# Patient Record
Sex: FEMALE | Race: WHITE | NOT HISPANIC OR LATINO | Employment: STUDENT | URBAN - METROPOLITAN AREA
[De-identification: names, ages, dates, MRNs, and addresses within clinical notes are randomized per-mention and may not be internally consistent; named-entity substitution may affect disease eponyms.]

---
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
## 2024-06-18 DIAGNOSIS — F41.9 ANXIETY: ICD-10-CM

## 2024-06-18 RX ORDER — BUSPIRONE HYDROCHLORIDE 7.5 MG/1
7.5 TABLET ORAL 3 TIMES DAILY
Qty: 270 TABLET | Refills: 0 | Status: SHIPPED | OUTPATIENT
Start: 2024-06-18

## 2024-07-05 ENCOUNTER — OFFICE VISIT (OUTPATIENT)
Dept: URGENT CARE | Facility: CLINIC | Age: 19
End: 2024-07-05
Payer: COMMERCIAL

## 2024-07-05 ENCOUNTER — TELEPHONE (OUTPATIENT)
Dept: FAMILY MEDICINE | Facility: CLINIC | Age: 19
End: 2024-07-05
Payer: COMMERCIAL

## 2024-07-05 VITALS
HEART RATE: 89 BPM | BODY MASS INDEX: 17.77 KG/M2 | TEMPERATURE: 98 F | WEIGHT: 120 LBS | RESPIRATION RATE: 18 BRPM | SYSTOLIC BLOOD PRESSURE: 106 MMHG | HEIGHT: 69 IN | OXYGEN SATURATION: 100 % | DIASTOLIC BLOOD PRESSURE: 70 MMHG

## 2024-07-05 DIAGNOSIS — G44.019 EPISODIC CLUSTER HEADACHE, NOT INTRACTABLE: Primary | ICD-10-CM

## 2024-07-05 DIAGNOSIS — H53.8 BLURRED VISION: ICD-10-CM

## 2024-07-05 DIAGNOSIS — R20.2 PARESTHESIA OF ARM: ICD-10-CM

## 2024-07-05 PROBLEM — R51.9 NONINTRACTABLE EPISODIC HEADACHE: Status: ACTIVE | Noted: 2024-07-05

## 2024-07-05 NOTE — TELEPHONE ENCOUNTER
----- Message from Rober Carter sent at 7/5/2024 11:22 AM CDT -----  Type:  Needs Medical Advice    Who Called: pt mom - Sayda  Symptoms (please be specific):    How long has patient had these symptoms:    Pharmacy name and phone #:   Would the patient rather a call back or a response via [facility]?   Best Call Back Number: [phone]  Additional Information: pt went to walk in clinic today, stated physician recommended labs , due to suspected low potassium level. Pt mom would like a follow up call to discuss

## 2024-07-05 NOTE — PROGRESS NOTES
"Subjective:      Patient ID: Siobhan Hebert is a 18 y.o. female.    Vitals:  height is 5' 8.5" (1.74 m) and weight is 54.4 kg (120 lb). Her temperature is 98.1 °F (36.7 °C). Her blood pressure is 106/70 and her pulse is 89. Her respiration is 18 and oxygen saturation is 100%.     Chief Complaint: abdominal discomfort     Patient is a 18 y.o. female who presents to urgent care with complaints of numbness in hands and face switches sides started with left arm then right arm, tongue, cheek, and then lips, migraines, blurry vision, confusion and nausea once it passed. Patient states it goes on for about 2 hrs. Patient mothers took her glucose and it was at 90 this AM. Patients mother states they have sugar problems in family. Today she is experiencing abdominal discomfort. Patients maternal grandmother had a stroke at 19. Today while at the store her vision started going in and out and felt very weak, but she woke feeling weakness this morning.  Also had palpitations yesterday.  Started taking Aldactone approximately 3 months ago prescribed by her PCP and is also currently taking BuSpar for anxiety.  Same type symptoms 1 year ago went to ER diagnosed with dehydration and anxiety.    Gastrointestinal:  Positive for nausea. Negative for vomiting and diarrhea.   Neurological:  Positive for dizziness, light-headedness, headaches, numbness (left arm) and tingling.      Objective:     Physical Exam   Constitutional: She is oriented to person, place, and time. She appears well-developed. She is cooperative.   HENT:   Head: Normocephalic and atraumatic.   Ears:   Right Ear: Hearing, tympanic membrane, external ear and ear canal normal.   Left Ear: Hearing, tympanic membrane, external ear and ear canal normal.   Nose: Nose normal. No mucosal edema or nasal deformity. No epistaxis. Right sinus exhibits no maxillary sinus tenderness and no frontal sinus tenderness. Left sinus exhibits no maxillary sinus tenderness and no frontal " sinus tenderness.   Mouth/Throat: Uvula is midline, oropharynx is clear and moist and mucous membranes are normal. No trismus in the jaw. Normal dentition. No uvula swelling.   Eyes: Conjunctivae and lids are normal.   Neck: Trachea normal and phonation normal. Neck supple.   Cardiovascular: Normal rate, regular rhythm, normal heart sounds and normal pulses.   Pulmonary/Chest: Effort normal and breath sounds normal.   Abdominal: Normal appearance and bowel sounds are normal. Soft.   Musculoskeletal: Normal range of motion.         General: Normal range of motion.   Neurological: She is alert and oriented to person, place, and time. She exhibits normal muscle tone.   Skin: Skin is warm, dry and intact.   Psychiatric: Her speech is normal and behavior is normal. Judgment and thought content normal.   Nursing note and vitals reviewed.      Assessment:     1. Episodic cluster headache, not intractable    2. Blurred vision    3. Paresthesia of arm        Plan:   Patient here with her mother states will go to ER due to headache, blurred vision, tingling and numbness to the left arm, dizziness which is intermittent.  Also feels confused at times with nausea.  Prefers to have labs, EKG and other testing done in ER.  Prefers to go to ER per private vehicle    Episodic cluster headache, not intractable    Blurred vision    Paresthesia of arm

## 2024-07-05 NOTE — TELEPHONE ENCOUNTER
Spoke with pt and pts mom  Would like Dr Fung to look at [place] [facility] and give recommendations  Should pt go to ED, can Dr Fung order labs for pt to have done  Spoke with Dr Fung  Chart note reviewed by Dr Antonieta Fung recommendation agreeable with [facility] provider for pt to report to ED for evaluation  Spoke with pt & pts mother  Advised that Dr Fung would like for pt to be evaluated at the ED  Pts mom & pt both expressed understanding

## 2024-08-01 ENCOUNTER — TELEPHONE (OUTPATIENT)
Dept: FAMILY MEDICINE | Facility: CLINIC | Age: 19
End: 2024-08-01
Payer: COMMERCIAL

## 2024-08-09 ENCOUNTER — LAB VISIT (OUTPATIENT)
Dept: LAB | Facility: HOSPITAL | Age: 19
End: 2024-08-09
Attending: NURSE PRACTITIONER
Payer: COMMERCIAL

## 2024-08-09 DIAGNOSIS — Z00.00 WELLNESS EXAMINATION: ICD-10-CM

## 2024-08-09 DIAGNOSIS — L70.9 ACNE, UNSPECIFIED ACNE TYPE: ICD-10-CM

## 2024-08-09 DIAGNOSIS — J30.2 SEASONAL ALLERGIES: ICD-10-CM

## 2024-08-09 DIAGNOSIS — F41.9 ANXIETY: ICD-10-CM

## 2024-08-09 LAB
ALBUMIN SERPL-MCNC: 4.4 G/DL (ref 3.5–5)
ALBUMIN/GLOB SERPL: 1.5 RATIO (ref 1.1–2)
ALP SERPL-CCNC: 45 UNIT/L (ref 40–150)
ALT SERPL-CCNC: 23 UNIT/L (ref 0–55)
ANION GAP SERPL CALC-SCNC: 9 MEQ/L
AST SERPL-CCNC: 20 UNIT/L (ref 5–34)
BASOPHILS # BLD AUTO: 0.05 X10(3)/MCL
BASOPHILS NFR BLD AUTO: 0.9 %
BILIRUB SERPL-MCNC: 1 MG/DL
BUN SERPL-MCNC: 10.2 MG/DL (ref 7–18.7)
CALCIUM SERPL-MCNC: 10 MG/DL (ref 8.4–10.2)
CHLORIDE SERPL-SCNC: 107 MMOL/L (ref 98–107)
CHOLEST SERPL-MCNC: 184 MG/DL
CHOLEST/HDLC SERPL: 3 {RATIO} (ref 0–5)
CO2 SERPL-SCNC: 23 MMOL/L (ref 22–29)
CREAT SERPL-MCNC: 0.86 MG/DL (ref 0.55–1.02)
CREAT/UREA NIT SERPL: 12
EOSINOPHIL # BLD AUTO: 0.13 X10(3)/MCL (ref 0–0.9)
EOSINOPHIL NFR BLD AUTO: 2.3 %
ERYTHROCYTE [DISTWIDTH] IN BLOOD BY AUTOMATED COUNT: 12.4 % (ref 11.5–17)
GFR SERPLBLD CREATININE-BSD FMLA CKD-EPI: >60 ML/MIN/1.73/M2
GLOBULIN SER-MCNC: 3 GM/DL (ref 2.4–3.5)
GLUCOSE SERPL-MCNC: 80 MG/DL (ref 74–100)
HCT VFR BLD AUTO: 43.3 % (ref 37–47)
HDLC SERPL-MCNC: 63 MG/DL (ref 35–60)
HGB BLD-MCNC: 14.9 G/DL (ref 12–16)
IMM GRANULOCYTES # BLD AUTO: 0.02 X10(3)/MCL (ref 0–0.04)
IMM GRANULOCYTES NFR BLD AUTO: 0.4 %
LDLC SERPL CALC-MCNC: 109 MG/DL (ref 50–140)
LYMPHOCYTES # BLD AUTO: 1.4 X10(3)/MCL (ref 0.6–4.6)
LYMPHOCYTES NFR BLD AUTO: 24.6 %
MCH RBC QN AUTO: 31.2 PG (ref 27–31)
MCHC RBC AUTO-ENTMCNC: 34.4 G/DL (ref 33–36)
MCV RBC AUTO: 90.8 FL (ref 80–94)
MONOCYTES # BLD AUTO: 0.32 X10(3)/MCL (ref 0.1–1.3)
MONOCYTES NFR BLD AUTO: 5.6 %
NEUTROPHILS # BLD AUTO: 3.77 X10(3)/MCL (ref 2.1–9.2)
NEUTROPHILS NFR BLD AUTO: 66.2 %
NRBC BLD AUTO-RTO: 0 %
PLATELET # BLD AUTO: 223 X10(3)/MCL (ref 130–400)
PLATELETS.RETICULATED NFR BLD AUTO: 6 % (ref 0.9–11.2)
PMV BLD AUTO: 11.1 FL (ref 7.4–10.4)
POTASSIUM SERPL-SCNC: 3.7 MMOL/L (ref 3.5–5.1)
PROT SERPL-MCNC: 7.4 GM/DL (ref 6.4–8.3)
RBC # BLD AUTO: 4.77 X10(6)/MCL (ref 4.2–5.4)
SODIUM SERPL-SCNC: 139 MMOL/L (ref 136–145)
TRIGL SERPL-MCNC: 60 MG/DL (ref 37–140)
TSH SERPL-ACNC: 1.5 UIU/ML (ref 0.35–4.94)
VLDLC SERPL CALC-MCNC: 12 MG/DL
WBC # BLD AUTO: 5.69 X10(3)/MCL (ref 4.5–11.5)

## 2024-08-09 PROCEDURE — 36415 COLL VENOUS BLD VENIPUNCTURE: CPT

## 2024-08-09 PROCEDURE — 85025 COMPLETE CBC W/AUTO DIFF WBC: CPT

## 2024-08-09 PROCEDURE — 80053 COMPREHEN METABOLIC PANEL: CPT

## 2024-08-09 PROCEDURE — 84443 ASSAY THYROID STIM HORMONE: CPT

## 2024-08-09 PROCEDURE — 80061 LIPID PANEL: CPT

## 2024-08-12 ENCOUNTER — OFFICE VISIT (OUTPATIENT)
Dept: FAMILY MEDICINE | Facility: CLINIC | Age: 19
End: 2024-08-12
Payer: COMMERCIAL

## 2024-08-12 VITALS
OXYGEN SATURATION: 98 % | BODY MASS INDEX: 18.37 KG/M2 | HEART RATE: 72 BPM | WEIGHT: 124 LBS | RESPIRATION RATE: 16 BRPM | TEMPERATURE: 99 F | DIASTOLIC BLOOD PRESSURE: 72 MMHG | HEIGHT: 69 IN | SYSTOLIC BLOOD PRESSURE: 112 MMHG

## 2024-08-12 DIAGNOSIS — R20.2 PARESTHESIA: ICD-10-CM

## 2024-08-12 DIAGNOSIS — N92.6 IRREGULAR MENSTRUAL CYCLE: ICD-10-CM

## 2024-08-12 DIAGNOSIS — Z00.00 WELLNESS EXAMINATION: Primary | ICD-10-CM

## 2024-08-12 DIAGNOSIS — J30.2 SEASONAL ALLERGIES: ICD-10-CM

## 2024-08-12 DIAGNOSIS — F41.9 ANXIETY: ICD-10-CM

## 2024-08-12 DIAGNOSIS — Z83.3 FAMILY HISTORY OF DIABETES MELLITUS: ICD-10-CM

## 2024-08-12 DIAGNOSIS — L70.9 ACNE, UNSPECIFIED ACNE TYPE: ICD-10-CM

## 2024-08-12 PROBLEM — R39.9 UTI SYMPTOMS: Status: RESOLVED | Noted: 2023-08-08 | Resolved: 2024-08-12

## 2024-08-12 PROBLEM — R51.9 NONINTRACTABLE EPISODIC HEADACHE: Status: RESOLVED | Noted: 2024-07-05 | Resolved: 2024-08-12

## 2024-08-12 PROBLEM — N30.00 ACUTE CYSTITIS WITHOUT HEMATURIA: Status: RESOLVED | Noted: 2023-11-08 | Resolved: 2024-08-12

## 2024-08-12 PROBLEM — H53.8 BLURRED VISION: Status: RESOLVED | Noted: 2024-07-05 | Resolved: 2024-08-12

## 2024-08-12 PROCEDURE — 3078F DIAST BP <80 MM HG: CPT | Mod: CPTII,,, | Performed by: FAMILY MEDICINE

## 2024-08-12 PROCEDURE — 3074F SYST BP LT 130 MM HG: CPT | Mod: CPTII,,, | Performed by: FAMILY MEDICINE

## 2024-08-12 PROCEDURE — 99395 PREV VISIT EST AGE 18-39: CPT | Mod: ,,, | Performed by: FAMILY MEDICINE

## 2024-08-12 PROCEDURE — 1160F RVW MEDS BY RX/DR IN RCRD: CPT | Mod: CPTII,,, | Performed by: FAMILY MEDICINE

## 2024-08-12 PROCEDURE — 3008F BODY MASS INDEX DOCD: CPT | Mod: CPTII,,, | Performed by: FAMILY MEDICINE

## 2024-08-12 PROCEDURE — 1159F MED LIST DOCD IN RCRD: CPT | Mod: CPTII,,, | Performed by: FAMILY MEDICINE

## 2024-08-12 RX ORDER — KETOCONAZOLE 20 MG/ML
SHAMPOO, SUSPENSION TOPICAL
COMMUNITY
Start: 2024-07-29

## 2024-08-12 RX ORDER — TRIAMCINOLONE ACETONIDE 1 MG/G
CREAM TOPICAL 2 TIMES DAILY PRN
COMMUNITY
Start: 2024-07-29

## 2024-08-12 NOTE — PROGRESS NOTES
"Subjective:        Patient ID: Siobhan Hebert is a 18 y.o. female.    Chief Complaint: Annual Exam (Wellness )      presents to the clinic unaccompanied for her wellness visit. She did labs prior to her appt and it was reviewed with her at time of appt today    She went to ER 7/5/24 for episode similar to previous year.  Has been drinking more water since. Causing her neuro migraines- paresthesias.  Has seen neuro in the past (as child).   Has not seen as adult. Denies history of MS.  Does not get headache often.  Happened when was watching tv and vision changes.  Trouble texting and understanding.  She got glasses 4/2024.  Trouble adjusting to near/far vision.  Saw her ophtho recently. Thought possible related to glucose. Has family history of dmii.  Sometimes clumsy. No loss of bowel or bladder       She has anxiety. Is on buspar since 5/2023. Doing well.       She has eczema/seasonal allergies. On zyrtec     She also has acne. Is on spiroloactone for acne with dr. Moises finn.        She reports history of irregular menstrual cycles.  Menarche around age 10.seeing dr. lynda pradhan. Just saw her at end 7/2024.   she is currently on lo loestrin fe.  Taking pro and prebiotic to help regulate bowels.      She is ALLERGIC to milk. 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        Review of Systems   Constitutional: Negative.    HENT: Negative.     Respiratory: Negative.     Cardiovascular: Negative.    Gastrointestinal: Negative.    Genitourinary: Negative.          Review of patient's allergies indicates:   Allergen Reactions    Milk      exzema      Vitals:    08/12/24 0804   BP: 112/72   BP Location: Left arm   Pulse: 72   Resp: 16   Temp: 98.6 °F (37 °C)   TempSrc: Temporal   SpO2: 98%   Weight: 56.2 kg (124 lb)   Height: 5' 8.5" (1.74 m)      Social History     Socioeconomic History    Marital status: Single   Tobacco Use    Smoking status: Never     Passive exposure: Never    " Smokeless tobacco: Never   Substance and Sexual Activity    Alcohol use: Never    Drug use: Never    Sexual activity: Yes     Partners: Male     Birth control/protection: Condom, OCP     Comment: Lo Loestrin birth control      Family History   Problem Relation Name Age of Onset    No Known Problems Mother      No Known Problems Father      No Known Problems Brother            Objective:     Physical Exam  Vitals and nursing note reviewed.   Constitutional:       Appearance: Normal appearance. She is normal weight.   HENT:      Head: Normocephalic and atraumatic.      Nose: Nose normal.      Mouth/Throat:      Mouth: Mucous membranes are moist.      Pharynx: Oropharynx is clear.   Eyes:      Extraocular Movements: Extraocular movements intact.   Cardiovascular:      Rate and Rhythm: Normal rate and regular rhythm.      Pulses: Normal pulses.      Heart sounds: Normal heart sounds.   Pulmonary:      Effort: Pulmonary effort is normal.      Breath sounds: Normal breath sounds.   Musculoskeletal:         General: Normal range of motion.      Cervical back: Normal range of motion.   Skin:     General: Skin is warm and dry.   Neurological:      General: No focal deficit present.      Mental Status: She is alert and oriented to person, place, and time. Mental status is at baseline.   Psychiatric:         Mood and Affect: Mood normal.       Current Outpatient Medications on File Prior to Visit   Medication Sig Dispense Refill    busPIRone (BUSPAR) 7.5 MG tablet TAKE 1 TABLET BY MOUTH THREE TIMES A  tablet 0    cetirizine (ZYRTEC) 10 MG tablet Take 10 mg by mouth once daily.      ketoconazole (NIZORAL) 2 % shampoo PLEASE SEE ATTACHED FOR DETAILED DIRECTIONS      LO LOESTRIN FE 1 mg-10 mcg (24)/10 mcg (2) Tab Take 1 tablet by mouth once daily.      spironolactone (ALDACTONE) 100 MG tablet       triamcinolone acetonide 0.1% (KENALOG) 0.1 % cream Apply topically 2 (two) times daily as needed.      [DISCONTINUED] dapsone  (ACZONE) 7.5 % GlwP Apply topically. (Patient not taking: Reported on 7/5/2024)      [DISCONTINUED] ibuprofen (ADVIL,MOTRIN) 100 MG tablet Take 100 mg by mouth every 6 (six) hours as needed for Temperature greater than. OTC.. Patient unsure of dose (Patient not taking: Reported on 7/5/2024)      [DISCONTINUED] tretinoin (RETIN-A) 0.025 % cream APPLY TO FACE NIGHTLY-- START EVERY OTHER NIGHT THEN USE NIGHTLY ONCE ABLE TO TOLERATE (Patient not taking: Reported on 7/5/2024)       No current facility-administered medications on file prior to visit.     Health Maintenance   Topic Date Due    Chlamydia Screening  Never done    HPV Vaccines (1 - 3-dose series) Never done    TETANUS VACCINE  12/02/2025    Hepatitis C Screening  Completed    Lipid Panel  Completed      Results for orders placed or performed in visit on 08/09/24   Comprehensive Metabolic Panel   Result Value Ref Range    Sodium 139 136 - 145 mmol/L    Potassium 3.7 3.5 - 5.1 mmol/L    Chloride 107 98 - 107 mmol/L    CO2 23 22 - 29 mmol/L    Glucose 80 74 - 100 mg/dL    Blood Urea Nitrogen 10.2 7.0 - 18.7 mg/dL    Creatinine 0.86 0.55 - 1.02 mg/dL    Calcium 10.0 8.4 - 10.2 mg/dL    Protein Total 7.4 6.4 - 8.3 gm/dL    Albumin 4.4 3.5 - 5.0 g/dL    Globulin 3.0 2.4 - 3.5 gm/dL    Albumin/Globulin Ratio 1.5 1.1 - 2.0 ratio    Bilirubin Total 1.0 <=1.5 mg/dL    ALP 45 40 - 150 unit/L    ALT 23 0 - 55 unit/L    AST 20 5 - 34 unit/L    eGFR >60 mL/min/1.73/m2    Anion Gap 9.0 mEq/L    BUN/Creatinine Ratio 12    Lipid Panel   Result Value Ref Range    Cholesterol Total 184 <=200 mg/dL    HDL Cholesterol 63 (H) 35 - 60 mg/dL    Triglyceride 60 37 - 140 mg/dL    Cholesterol/HDL Ratio 3 0 - 5    Very Low Density Lipoprotein 12     LDL Cholesterol 109.00 50.00 - 140.00 mg/dL   TSH   Result Value Ref Range    TSH 1.497 0.350 - 4.940 uIU/mL   CBC with Differential   Result Value Ref Range    WBC 5.69 4.50 - 11.50 x10(3)/mcL    RBC 4.77 4.20 - 5.40 x10(6)/mcL    Hgb 14.9  12.0 - 16.0 g/dL    Hct 43.3 37.0 - 47.0 %    MCV 90.8 80.0 - 94.0 fL    MCH 31.2 (H) 27.0 - 31.0 pg    MCHC 34.4 33.0 - 36.0 g/dL    RDW 12.4 11.5 - 17.0 %    Platelet 223 130 - 400 x10(3)/mcL    MPV 11.1 (H) 7.4 - 10.4 fL    Neut % 66.2 %    Lymph % 24.6 %    Mono % 5.6 %    Eos % 2.3 %    Basophil % 0.9 %    Lymph # 1.40 0.6 - 4.6 x10(3)/mcL    Neut # 3.77 2.1 - 9.2 x10(3)/mcL    Mono # 0.32 0.1 - 1.3 x10(3)/mcL    Eos # 0.13 0 - 0.9 x10(3)/mcL    Baso # 0.05 <=0.2 x10(3)/mcL    IG# 0.02 0 - 0.04 x10(3)/mcL    IG% 0.4 %    NRBC% 0.0 %    IPF 6.0 0.9 - 11.2 %          Assessment & Plan:     Active Problem List with Overview Notes    Diagnosis Date Noted    Paresthesia 07/05/2024    Anxiety 05/25/2023    Acne 08/07/2022    Wellness examination 08/04/2022    Eczema 08/04/2022    Irregular menstrual cycle 08/04/2022    Seasonal allergies 08/04/2022       1. Wellness examination  Assessment & Plan:  Previously done labs reviewed with patient at time of visit   Keep appointments with gyn    Orders:  -     CBC Auto Differential; Future; Expected date: 08/12/2025  -     Comprehensive Metabolic Panel; Future; Expected date: 08/12/2025  -     Lipid Panel; Future; Expected date: 08/12/2025  -     TSH; Future; Expected date: 08/12/2025  -     Hemoglobin A1C; Future; Expected date: 08/12/2025    2. Anxiety  Assessment & Plan:  on buspar. Doing well. No refill requested.  Consider counselor.  Contact clinic for concerns. Patient is agreeable to plan and verbalized understanding    Orders:  -     CBC Auto Differential; Future; Expected date: 08/12/2025  -     Comprehensive Metabolic Panel; Future; Expected date: 08/12/2025  -     Lipid Panel; Future; Expected date: 08/12/2025  -     TSH; Future; Expected date: 08/12/2025  -     Hemoglobin A1C; Future; Expected date: 08/12/2025    3. Acne, unspecified acne type  Assessment & Plan:  On spironolactone per derm- dr. Trinidad.Keep scheduled appts    Orders:  -     CBC Auto  Differential; Future; Expected date: 08/12/2025  -     Comprehensive Metabolic Panel; Future; Expected date: 08/12/2025  -     Lipid Panel; Future; Expected date: 08/12/2025  -     TSH; Future; Expected date: 08/12/2025  -     Hemoglobin A1C; Future; Expected date: 08/12/2025    4. Irregular menstrual cycle  Assessment & Plan:  On ocp. Keep appointments with gyn    Orders:  -     CBC Auto Differential; Future; Expected date: 08/12/2025  -     Comprehensive Metabolic Panel; Future; Expected date: 08/12/2025  -     Lipid Panel; Future; Expected date: 08/12/2025  -     TSH; Future; Expected date: 08/12/2025  -     Hemoglobin A1C; Future; Expected date: 08/12/2025    5. Seasonal allergies  Assessment & Plan:  Stable on zyrtec    Orders:  -     CBC Auto Differential; Future; Expected date: 08/12/2025  -     Comprehensive Metabolic Panel; Future; Expected date: 08/12/2025  -     Lipid Panel; Future; Expected date: 08/12/2025  -     TSH; Future; Expected date: 08/12/2025  -     Hemoglobin A1C; Future; Expected date: 08/12/2025    6. Paresthesia  Assessment & Plan:  History of migraine x 2 with paresthesia, vision changes.  Has been increasing fluids.  Likely not med side effect as only happening annually. Cbgs wnl.  ?MS? Will place neuro referral.  Has seen neuro as child.  Had ct head 5/2023 in er- wnl.     Monitor symptoms. Contact clinic for concerns.  ER precautions.     Orders:  -     Ambulatory referral/consult to Neurology; Future; Expected date: 08/19/2024  -     Hemoglobin A1C; Future; Expected date: 08/12/2025    7. Family history of diabetes mellitus  -     Hemoglobin A1C; Future; Expected date: 08/12/2025         Follow up in about 1 year (around 8/12/2025) for Wellness with Labs.

## 2024-08-12 NOTE — ASSESSMENT & PLAN NOTE
History of migraine x 2 with paresthesia, vision changes.  Has been increasing fluids.  Likely not med side effect as only happening annually. Cbgs wnl.  ?MS? Will place neuro referral.  Has seen neuro as child.  Had ct head 5/2023 in er- wnl.     Monitor symptoms. Contact clinic for concerns.  ER precautions.

## 2024-09-27 ENCOUNTER — DOCUMENTATION ONLY (OUTPATIENT)
Dept: FAMILY MEDICINE | Facility: CLINIC | Age: 19
End: 2024-09-27
Payer: COMMERCIAL

## 2024-09-30 ENCOUNTER — TELEPHONE (OUTPATIENT)
Dept: FAMILY MEDICINE | Facility: CLINIC | Age: 19
End: 2024-09-30
Payer: COMMERCIAL

## 2024-09-30 DIAGNOSIS — Z02.0 SCHOOL PHYSICAL EXAM: Primary | ICD-10-CM

## 2024-09-30 NOTE — TELEPHONE ENCOUNTER
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    She said that she was told to go to [facility] for TB test as well as immunizations that are not offered in clinic, and that we would be placing orders for her to complete TB test as well as orders placed for her to have immunizations completed at [facility]. Again I see no documentation of that in chart. She was unsure of who she spoke with. I advised that I would put a message in for orders that were necessary

## 2024-09-30 NOTE — TELEPHONE ENCOUNTER
----- Message from Jose Alberto sent at 9/30/2024  2:00 PM CDT -----  .Who Called: Siobhan Hebert    Caller is requesting assistance/information from provider's office.    Symptoms (please be specific):    How long has patient had these symptoms:    List of preferred pharmacies on file (remove unneeded):     Preferred Method of Contact: Phone Call  Patient's Preferred Phone Number on File: [phone]   Best Call Back Number, if different:  Additional Information:  pt called to check on status of orders

## 2024-10-01 NOTE — TELEPHONE ENCOUNTER
I have signed for the following orders AND/OR meds.  Please call the patient and ask the patient to schedule the testing AND/OR inform about any medications that were sent.      Orders Placed This Encounter   Procedures    POCT TB Skin Test Read     Standing Status:   Future     Standing Expiration Date:   11/30/2025          I think I ordered it correctly.  Please remind pt she needs to have it read between 48-72 hours after being placed.  I think [facility] may only do it on certain days.  She may want to make an appt so she does not have to wait.      Is there a form we need to complete??    Let me know if I need to change the order.

## 2024-11-18 ENCOUNTER — OFFICE VISIT (OUTPATIENT)
Dept: NEUROLOGY | Facility: CLINIC | Age: 19
End: 2024-11-18
Payer: COMMERCIAL

## 2024-11-18 VITALS
WEIGHT: 127 LBS | HEART RATE: 72 BPM | SYSTOLIC BLOOD PRESSURE: 100 MMHG | HEIGHT: 68 IN | DIASTOLIC BLOOD PRESSURE: 62 MMHG | BODY MASS INDEX: 19.25 KG/M2

## 2024-11-18 DIAGNOSIS — G43.109 MIGRAINE WITH AURA AND WITHOUT STATUS MIGRAINOSUS, NOT INTRACTABLE: Primary | ICD-10-CM

## 2024-11-18 DIAGNOSIS — R20.2 PARESTHESIA: ICD-10-CM

## 2024-11-18 PROCEDURE — 3074F SYST BP LT 130 MM HG: CPT | Mod: CPTII,S$GLB,, | Performed by: PSYCHIATRY & NEUROLOGY

## 2024-11-18 PROCEDURE — 99999 PR PBB SHADOW E&M-EST. PATIENT-LVL IV: CPT | Mod: PBBFAC,,, | Performed by: PSYCHIATRY & NEUROLOGY

## 2024-11-18 PROCEDURE — 1159F MED LIST DOCD IN RCRD: CPT | Mod: CPTII,S$GLB,, | Performed by: PSYCHIATRY & NEUROLOGY

## 2024-11-18 PROCEDURE — 3008F BODY MASS INDEX DOCD: CPT | Mod: CPTII,S$GLB,, | Performed by: PSYCHIATRY & NEUROLOGY

## 2024-11-18 PROCEDURE — 99204 OFFICE O/P NEW MOD 45 MIN: CPT | Mod: S$GLB,,, | Performed by: PSYCHIATRY & NEUROLOGY

## 2024-11-18 PROCEDURE — 3078F DIAST BP <80 MM HG: CPT | Mod: CPTII,S$GLB,, | Performed by: PSYCHIATRY & NEUROLOGY

## 2024-11-18 NOTE — PROGRESS NOTES
Chief Complaint   Patient presents with    Numbness     NP: Referred by Dr. Babs Fung for neuro consult to evaluate for paresthesia: Is having episodes where she loses her vision on the right side of her eye, then she gets numbness and tinging to face and hands, and then she will get a headache to frontal area radiating to the sides of head. Headaches lasts for about for over 3 hours. Pain is throbbing to her head. When she gets these episodes she is under to focus. Headaches are sensitive to light. Also, reports get dizzy with episodes.        This is a 18 y.o. female  here for migraine.  Patient has a history of migraine and previously saw a pediatric neurologist around the age of 8 or 9 years old.  She recalls having a workup and getting an MRI at that time which was normal.  Headaches went away however starting in May of 2023 she developed a different kind of headache.  Headache is preceded by an aura of loss of vision usually on the right which ultimately lasts for about 40 minutes.  About 15 minutes after the vision changes start she notices tingling on the opposite arm radiating up from her hand up to her neck and face.  This typically lasts for about 30 minutes.  These symptoms are followed by a severe throbbing headache that lasts for hours and is associated with dizziness and photophobia.  She has had about 3 episodes this year.  The last 1 she had was a few weeks ago.  She was at school when this happened.  She gets very anxious when the symptoms occur.  She was told this could be due to dehydration and was seen in the ER on 2 occasions when this occurred.  A CT head was unremarkable.  She takes BuSpar as well as Loestrin OCP.  She sees Dr. Colleen Ruiz.  Her mother has a history of migraine as well as her aunt and her cousin.  Also her maternal grandfather.  Was on spironolactone for acne but stopped that 3-4 weeks ago.      Medication List with Changes/Refills   New Medications    UBROGEPANT  (UBRELVY) 100 MG TABLET    Take 1 tablet (100 mg total) by mouth once as needed for Migraine.   Current Medications    BUSPIRONE (BUSPAR) 7.5 MG TABLET    TAKE 1 TABLET BY MOUTH THREE TIMES A DAY    CETIRIZINE (ZYRTEC) 10 MG TABLET    Take 10 mg by mouth once daily.    KETOCONAZOLE (NIZORAL) 2 % SHAMPOO    PLEASE SEE ATTACHED FOR DETAILED DIRECTIONS    LO LOESTRIN FE 1 MG-10 MCG (24)/10 MCG (2) TAB    Take 1 tablet by mouth once daily.    SPIRONOLACTONE (ALDACTONE) 100 MG TABLET        TRIAMCINOLONE ACETONIDE 0.1% (KENALOG) 0.1 % CREAM    Apply topically 2 (two) times daily as needed.        Past Surgical History:   Procedure Laterality Date    NO PAST SURGERIES          Past Medical History:   Diagnosis Date    Acne     Anxiety         Family History   Problem Relation Name Age of Onset    No Known Problems Mother      No Known Problems Father      Hypertension Brother          Social History     Socioeconomic History    Marital status: Single   Tobacco Use    Smoking status: Never     Passive exposure: Never    Smokeless tobacco: Never   Substance and Sexual Activity    Alcohol use: Never    Drug use: Never    Sexual activity: Yes     Partners: Male     Birth control/protection: Condom, OCP     Comment: Lo Loestrin birth control          Review of Systems  Review of Systems   Constitutional: Negative for appetite change.   HENT: Negative for sinus pressure and sore throat.    Eyes: Negative for visual disturbance.   Respiratory: Negative for cough and shortness of breath.    Cardiovascular: Negative for chest pain.   Gastrointestinal: Negative for diarrhea and nausea.   Endocrine: Negative for cold intolerance and heat intolerance.   Genitourinary: Negative for dysuria.   Musculoskeletal: Negative for arthralgias and myalgias.   Skin: Negative for rash.   Allergic/Immunologic: Negative for immunocompromised state.   Neurological:        See HPI   Hematological: Does not bruise/bleed easily.    Psychiatric/Behavioral: Negative for hallucinations.      General: alert and oriented, no acute distress, no audible wheezes, pulse intact, no edema    Vitals:    11/18/24 1311   BP: 100/62   Pulse: 72        Cognition and Comprehension  Speech and language intact  Follows commands  Speech fluent  Attention intact  Memory for recent events intact from history taking  Affect pleasant  Fund of knowledge adequate    Cranial nerves  II. Optic: Visual fields full to confrontation both eyes  III, IV, VI. Oculomotor: Intact, Pupils equal, round and reactive to light, no nystagmus  V. Trigeminal: sensation to light touch normal  VII. No facial asymmetry or facial weakness  VIII. Hearing intact to spoken voice  IX/X. Glossopharyngeal/Vagus: Voice normal, palate rises symmetrically  XI. Axillary: Shoulder shrug normal  XII. Hypoglossal: Intact    Muscle Strength and Tone  Normal upper extremity tone  Normal lower extremity tone  Normal upper extremity strength  Normal lower extremity strength    Sensation  Intact to light touch and temperature    Reflexes  Normal and symmetric    Coordination and Gait  Finger to nose normal  Gait normal       Siobhan was seen today for numbness.    Diagnoses and all orders for this visit:    Migraine with aura and without status migrainosus, not intractable  -     ubrogepant (UBRELVY) 100 mg tablet; Take 1 tablet (100 mg total) by mouth once as needed for Migraine.    Paresthesia  -     Ambulatory referral/consult to Neurology  -     MRI Brain Without Contrast; Future  -     MRI Brain Without Contrast       Concern for complex migraine.  Given candace sensory loss, would avoid triptans.  Recommend Ubrelvy.  We will check MRI brain.  Counseled regarding estrogen containing OCPs and increased relative risk of stroke.  will discuss with her gynecologist.  Also recommend magnesium 400 daily

## 2024-12-09 ENCOUNTER — TELEPHONE (OUTPATIENT)
Dept: NEUROLOGY | Facility: CLINIC | Age: 19
End: 2024-12-09
Payer: COMMERCIAL

## 2024-12-09 NOTE — TELEPHONE ENCOUNTER
----- Message from Magdalena Arriaza MD sent at 12/9/2024  4:37 PM CST -----  Please let patient know MRI is normal.

## 2024-12-28 ENCOUNTER — OFFICE VISIT (OUTPATIENT)
Dept: URGENT CARE | Facility: CLINIC | Age: 19
End: 2024-12-28
Payer: COMMERCIAL

## 2024-12-28 VITALS
HEART RATE: 76 BPM | SYSTOLIC BLOOD PRESSURE: 109 MMHG | OXYGEN SATURATION: 98 % | RESPIRATION RATE: 18 BRPM | BODY MASS INDEX: 19.4 KG/M2 | WEIGHT: 128 LBS | HEIGHT: 68 IN | DIASTOLIC BLOOD PRESSURE: 77 MMHG | TEMPERATURE: 99 F

## 2024-12-28 DIAGNOSIS — R05.9 COUGH, UNSPECIFIED TYPE: ICD-10-CM

## 2024-12-28 DIAGNOSIS — J02.9 SORE THROAT: Primary | ICD-10-CM

## 2024-12-28 DIAGNOSIS — R09.82 POST-NASAL DRIP: ICD-10-CM

## 2024-12-28 LAB
CTP QC/QA: YES
CTP QC/QA: YES
MOLECULAR STREP A: NEGATIVE
POC MOLECULAR INFLUENZA A AGN: NEGATIVE
POC MOLECULAR INFLUENZA B AGN: NEGATIVE

## 2024-12-28 PROCEDURE — 99213 OFFICE O/P EST LOW 20 MIN: CPT | Mod: ,,, | Performed by: NURSE PRACTITIONER

## 2024-12-28 NOTE — PROGRESS NOTES
"Subjective:      Patient ID: Siobhan Hebert is a 19 y.o. female.    Vitals:  height is 5' 8" (1.727 m) and weight is 58.1 kg (128 lb). Her oral temperature is 98.8 °F (37.1 °C). Her blood pressure is 109/77 and her pulse is 76. Her respiration is 18 and oxygen saturation is 98%.     Chief Complaint: Sore Throat     Patient is a 19 y.o. female who presents to urgent care with complaints of sore throat, stomach ache , coughing, headache and PND  x4 days. Alleviating factors include Dayquil with mild amount of relief. Patient denies congestion , body aches and  fever.     Sore Throat       HENT:  Positive for sore throat.       Objective:     Physical Exam   Constitutional: She is oriented to person, place, and time. She appears well-developed. She is cooperative.  Non-toxic appearance. She does not appear ill. No distress.   HENT:   Head: Normocephalic and atraumatic.   Ears:   Right Ear: Hearing, tympanic membrane, external ear and ear canal normal.   Left Ear: Hearing, tympanic membrane, external ear and ear canal normal.   Nose: Nose normal. No mucosal edema, rhinorrhea or nasal deformity. No epistaxis. Right sinus exhibits no maxillary sinus tenderness and no frontal sinus tenderness. Left sinus exhibits no maxillary sinus tenderness and no frontal sinus tenderness.   Mouth/Throat: Uvula is midline, oropharynx is clear and moist and mucous membranes are normal. No trismus in the jaw. Normal dentition. No uvula swelling. No oropharyngeal exudate, posterior oropharyngeal edema or posterior oropharyngeal erythema.   Eyes: Conjunctivae and lids are normal. No scleral icterus.   Neck: Trachea normal and phonation normal. Neck supple. No edema present. No erythema present. No neck rigidity present.   Cardiovascular: Normal rate, regular rhythm, normal heart sounds and normal pulses.   Pulmonary/Chest: Effort normal and breath sounds normal. No respiratory distress. She has no decreased breath sounds. She has no rhonchi. " "  Abdominal: Normal appearance.   Musculoskeletal: Normal range of motion.         General: No deformity. Normal range of motion.   Neurological: She is alert and oriented to person, place, and time. She exhibits normal muscle tone. Coordination normal.   Skin: Skin is warm, dry, intact, not diaphoretic and not pale.   Psychiatric: Her speech is normal and behavior is normal. Judgment and thought content normal.   Nursing note and vitals reviewed.         Previous History      Review of patient's allergies indicates:   Allergen Reactions    Milk      exzema       Past Medical History:   Diagnosis Date    Acne     Anxiety      Current Outpatient Medications   Medication Instructions    busPIRone (BUSPAR) 7.5 mg, Oral, 3 times daily    cetirizine (ZYRTEC) 10 mg, Daily    ketoconazole (NIZORAL) 2 % shampoo PLEASE SEE ATTACHED FOR DETAILED DIRECTIONS    LO LOESTRIN FE 1 mg-10 mcg (24)/10 mcg (2) Tab 1 tablet, Daily    spironolactone (ALDACTONE) 100 MG tablet No dose, route, or frequency recorded.    triamcinolone acetonide 0.1% (KENALOG) 0.1 % cream Topical (Top), 2 times daily PRN     Past Surgical History:   Procedure Laterality Date    NO PAST SURGERIES           Social History     Tobacco Use    Smoking status: Never     Passive exposure: Never    Smokeless tobacco: Never   Substance Use Topics    Alcohol use: Never    Drug use: Never        Physical Exam      Vital Signs Reviewed   /77   Pulse 76   Temp 98.8 °F (37.1 °C) (Oral)   Resp 18   Ht 5' 8" (1.727 m)   Wt 58.1 kg (128 lb)   LMP  (LMP Unknown)   SpO2 98%   BMI 19.46 kg/m²        Procedures    Procedures     Labs     Results for orders placed or performed in visit on 12/28/24   POCT Strep A, Molecular    Collection Time: 12/28/24  2:24 PM   Result Value Ref Range    Molecular Strep A, POC Negative Negative     Acceptable Yes    POCT Influenza A/B Molecular    Collection Time: 12/28/24  2:30 PM   Result Value Ref Range    POC " "Molecular Influenza A Ag Negative Negative    POC Molecular Influenza B Ag Negative Negative     Acceptable Yes       Assessment:     1. Sore throat    2. Cough, unspecified type    3. Post-nasal drip        Plan:     Rest at home.     - Drink plenty of fluids so you won't get dehydrated.     - Do not share any utensils or share drinks     - Wash hands frequently     - you can take plain Mucinex (guaifenesin) 1200 mg twice a day to help loosen mucous.     Avoid taking Decongestants such as Sudafed, pseudoephedrine, phenylephrine or meds that say "cold," "sinus" or "-D" if you have high blood pressure. Coricidin HBP is acceptable          - Fever/Pain recommendations:  Alternate Tylenol or Ibuprofen as directed for fever/pain. Avoid tylenol if you have a history of liver disease. Do not take ibuprofen if you have a history of GI bleeding, kidney disease, or if you take blood thinners. Take ibuprofen 600-800 mg every 6-8 hours for pain and inflammation.  You can also take Tylenol/acetaminophen 650-1000 mg every 6-8 hours for added pain relief. (every 3 hrs)     - Cough recommendations:  Warm tea with honey can help with cough. Honey is a natural cough suppressant.     Dextromethorphan (DM) is a cough suppressant over the counter (ie. mucinex DM, delsym).       - Flonase nasal spray, Warm face compresses/hot showers as often as you can to open sinuses, and saline flushes: postnasal drip and nasal congestion recommendations     -Sore throat recommendations: Warm fluids, warm salt water gargles, throat lozenges, tea, honey, soup, or drinking something cold or frozen.  Throat lozenges or sprays help reduce pain. Gargling with warm saltwater (1/4 teaspoon of salt in 1/2 cup of warm water) or an OTC anesthetic gargle may be useful for irritation.  Honey/lemon water to soothe throat  Cold-eeze helps to reduce the duration of URI symptoms  Elderberry to reduce duration of URI symptoms  Cepachol helps to numb " the discomfort in throat    Other helpful Miscellaneous recommendations include :  Vicks vapor rub at night  Simple foods like chicken noodle soup help hydrate  Delsym helps with coughing at night  Zyrtec/Claritin during the day and Benadryl at night may help if allergy component  Zantac will help if there is reflux from the post nasal drip  Rest as much as you can  Your symptoms will likely last 7-10 days, maybe longer depending on how it affects your body.  You are contagious 7-10, so minimize contact with others to reduce the spread to others and stay home from work or school as we discussed. Dehydration is preventable but is one of the main reasons why you will feel so badly. Drink pedialyte, gatorade or propel. Stay hydrated.  Antibiotics are not needed unless a complication(such as Otitis Media, Bacterial sinus infection or pneumonia). Taking antibiotics for Flu/Cold is not supported by evidencebased medicine and can expose you to unnecessary side effects of the medication, such as anaphylaxis.  If you experience any:  Chest pain, shortness of breath, wheezing or difficulty breathing  Severe headache, face, neck or ear pain  New rash  Fever over 101.5º F (38.6 C) for more than three days  Confusion, behavior change or seizure  Severe weakness or dizziness  Go to ER     Sore throat  -     POCT Strep A, Molecular    Cough, unspecified type  -     POCT Influenza A/B Molecular    Post-nasal drip

## 2025-03-11 ENCOUNTER — OFFICE VISIT (OUTPATIENT)
Dept: URGENT CARE | Facility: CLINIC | Age: 20
End: 2025-03-11
Payer: COMMERCIAL

## 2025-03-11 VITALS
DIASTOLIC BLOOD PRESSURE: 73 MMHG | BODY MASS INDEX: 19.7 KG/M2 | HEART RATE: 85 BPM | SYSTOLIC BLOOD PRESSURE: 113 MMHG | RESPIRATION RATE: 18 BRPM | WEIGHT: 130 LBS | HEIGHT: 68 IN | TEMPERATURE: 99 F

## 2025-03-11 DIAGNOSIS — J02.9 SORE THROAT: ICD-10-CM

## 2025-03-11 DIAGNOSIS — J06.9 VIRAL URI: Primary | ICD-10-CM

## 2025-03-11 LAB
CTP QC/QA: YES
MOLECULAR STREP A: NEGATIVE
POC MOLECULAR INFLUENZA A AGN: NEGATIVE
POC MOLECULAR INFLUENZA B AGN: NEGATIVE
SARS CORONAVIRUS 2 ANTIGEN: NEGATIVE

## 2025-03-11 PROCEDURE — 87811 SARS-COV-2 COVID19 W/OPTIC: CPT | Mod: QW,,, | Performed by: FAMILY MEDICINE

## 2025-03-11 PROCEDURE — 87651 STREP A DNA AMP PROBE: CPT | Mod: QW,,, | Performed by: FAMILY MEDICINE

## 2025-03-11 PROCEDURE — 87502 INFLUENZA DNA AMP PROBE: CPT | Mod: QW,,, | Performed by: FAMILY MEDICINE

## 2025-03-11 PROCEDURE — 99214 OFFICE O/P EST MOD 30 MIN: CPT | Mod: ,,, | Performed by: FAMILY MEDICINE

## 2025-03-11 RX ORDER — UBROGEPANT 100 MG/1
TABLET ORAL
COMMUNITY
Start: 2024-11-20

## 2025-03-11 NOTE — PATIENT INSTRUCTIONS
Plan:   Negative COVID flu strep  As discussed if you do develop more flu-like symptoms consider returning as a nurse visit to be retested  Likely a viral upper respiratory infection that needs to run its course.  Symptoms can last 7-10 days.  Recommend treating with over-the-counter medications such as an antihistamine and steroid nasal spray for the sneezing runny nose and postnasal drip.  Monitor for fever tylenol or Motrin as needed  Rest and hydrate  If symptoms worsen return to clinic or seek medical attention immediately

## 2025-03-11 NOTE — PROGRESS NOTES
"Subjective:      Patient ID: Siobhan Hebert is a 19 y.o. female.    Vitals:  height is 5' 8" (1.727 m) and weight is 59 kg (130 lb). Her oral temperature is 99.2 °F (37.3 °C). Her blood pressure is 113/73 and her pulse is 85. Her respiration is 18.     Chief Complaint: Sore Throat     Patient is a 19 y.o. female who presents to urgent care with complaints of sneezing, sore throat,  headache, nausea, stomach ache, runny nose since yesterday afternoon.  Alleviating factors include Coricidin Cold and Flu with mild amount of relief. Patient denies vomiting, diarrhea, cough. Patient had flu exposure at home.  Denies any body aches.  May have had an elevated temp but did not check.      Constitution: Negative.   HENT:  Positive for sore throat.    Cardiovascular: Negative.    Eyes: Negative.    Respiratory: Negative.     Gastrointestinal: Negative.    Genitourinary: Negative.    Musculoskeletal: Negative.    Skin: Negative.    Allergic/Immunologic: Negative.    Neurological:  Positive for headaches.   Hematologic/Lymphatic: Negative.       Objective:     Physical Exam   Constitutional: She is oriented to person, place, and time. She appears well-developed. She is cooperative.  Non-toxic appearance. She does not appear ill. No distress.   HENT:   Head: Normocephalic and atraumatic.   Ears:   Right Ear: Hearing and external ear normal.   Left Ear: Hearing and external ear normal.   Mouth/Throat: Oropharynx is clear and moist and mucous membranes are normal. No oropharyngeal exudate or posterior oropharyngeal erythema (Postnasal drip).   Eyes: Conjunctivae and lids are normal.   Neck: Trachea normal and phonation normal. Neck supple. No edema present. No erythema present. No neck rigidity present.   Cardiovascular: Normal rate.   Pulmonary/Chest: Effort normal and breath sounds normal. No stridor. No respiratory distress. She has no decreased breath sounds. She has no wheezes. She has no rhonchi. She has no rales. " "  Abdominal: Normal appearance.   Lymphadenopathy:     She has cervical adenopathy.   Neurological: She is alert and oriented to person, place, and time. She exhibits normal muscle tone. Coordination normal.   Skin: Skin is warm, dry, intact, not diaphoretic and no rash.   Psychiatric: Her speech is normal and behavior is normal. Mood, judgment and thought content normal.   Nursing note and vitals reviewed.       Previous History      Review of patient's allergies indicates:   Allergen Reactions    Milk      Flares up eczema       Past Medical History:   Diagnosis Date    Acne     Anxiety     Eczema     Migraines      Current Outpatient Medications   Medication Instructions    busPIRone (BUSPAR) 7.5 mg, Oral, 3 times daily    cetirizine (ZYRTEC) 10 mg, Daily    ketoconazole (NIZORAL) 2 % shampoo PLEASE SEE ATTACHED FOR DETAILED DIRECTIONS    LO LOESTRIN FE 1 mg-10 mcg (24)/10 mcg (2) Tab 1 tablet, Daily    spironolactone (ALDACTONE) 100 MG tablet No dose, route, or frequency recorded.    triamcinolone acetonide 0.1% (KENALOG) 0.1 % cream Topical (Top), 2 times daily PRN    UBRELVY 100 mg tablet Take by mouth.     Past Surgical History:   Procedure Laterality Date    NO PAST SURGERIES       Family History   Problem Relation Name Age of Onset    No Known Problems Mother      No Known Problems Father      Hypertension Brother         Social History[1]     Physical Exam      Vital Signs Reviewed   /73   Pulse 85   Temp 99.2 °F (37.3 °C) (Oral)   Resp 18   Ht 5' 8" (1.727 m)   Wt 59 kg (130 lb)   BMI 19.77 kg/m²        Procedures    Procedures     Labs     Results for orders placed or performed in visit on 03/11/25   POCT Strep A, Molecular    Collection Time: 03/11/25 12:57 PM   Result Value Ref Range    Molecular Strep A, POC Negative Negative     Acceptable Yes    SARS Coronavirus 2 Antigen, POCT Manual Read    Collection Time: 03/11/25  1:03 PM   Result Value Ref Range    SARS Coronavirus 2 " Antigen Negative Negative, Presumptive Negative     Acceptable Yes    POCT Influenza A/B Molecular    Collection Time: 03/11/25  1:03 PM   Result Value Ref Range    POC Molecular Influenza A Ag Negative Negative    POC Molecular Influenza B Ag Negative Negative     Acceptable Yes          Assessment:     1. Viral URI    2. Sore throat        Plan:   Negative COVID flu strep  As discussed if you do develop more flu-like symptoms consider returning as a nurse visit to be retested  Likely a viral upper respiratory infection that needs to run its course.  Symptoms can last 7-10 days.  Recommend treating with over-the-counter medications such as an antihistamine and steroid nasal spray for the sneezing runny nose and postnasal drip.  Monitor for fever tylenol or Motrin as needed  Rest and hydrate  If symptoms worsen return to clinic or seek medical attention immediately    Viral URI    Sore throat  -     SARS Coronavirus 2 Antigen, POCT Manual Read  -     POCT Strep A, Molecular  -     POCT Influenza A/B Molecular                         [1]   Social History  Tobacco Use    Smoking status: Never     Passive exposure: Never    Smokeless tobacco: Never   Substance Use Topics    Alcohol use: Never    Drug use: Never

## 2025-04-16 ENCOUNTER — OFFICE VISIT (OUTPATIENT)
Dept: URGENT CARE | Facility: CLINIC | Age: 20
End: 2025-04-16
Payer: COMMERCIAL

## 2025-04-16 VITALS
OXYGEN SATURATION: 99 % | HEIGHT: 68 IN | SYSTOLIC BLOOD PRESSURE: 133 MMHG | DIASTOLIC BLOOD PRESSURE: 83 MMHG | WEIGHT: 125 LBS | HEART RATE: 106 BPM | BODY MASS INDEX: 18.94 KG/M2 | RESPIRATION RATE: 18 BRPM | TEMPERATURE: 98 F

## 2025-04-16 DIAGNOSIS — N30.00 ACUTE CYSTITIS WITHOUT HEMATURIA: ICD-10-CM

## 2025-04-16 DIAGNOSIS — R30.0 DYSURIA: Primary | ICD-10-CM

## 2025-04-16 LAB
BILIRUB UR QL STRIP: NEGATIVE
GLUCOSE UR QL STRIP: NEGATIVE
KETONES UR QL STRIP: NEGATIVE
LEUKOCYTE ESTERASE UR QL STRIP: POSITIVE
PH, POC UA: 6
POC BLOOD, URINE: NEGATIVE
POC NITRATES, URINE: POSITIVE
PROT UR QL STRIP: POSITIVE
SP GR UR STRIP: 1.01 (ref 1–1.03)
UROBILINOGEN UR STRIP-ACNC: NORMAL (ref 0.1–1.1)

## 2025-04-16 PROCEDURE — 81003 URINALYSIS AUTO W/O SCOPE: CPT | Mod: QW,,, | Performed by: NURSE PRACTITIONER

## 2025-04-16 PROCEDURE — 87086 URINE CULTURE/COLONY COUNT: CPT | Performed by: NURSE PRACTITIONER

## 2025-04-16 PROCEDURE — 99213 OFFICE O/P EST LOW 20 MIN: CPT | Mod: ,,, | Performed by: NURSE PRACTITIONER

## 2025-04-16 RX ORDER — NITROFURANTOIN 25; 75 MG/1; MG/1
100 CAPSULE ORAL 2 TIMES DAILY
Qty: 14 CAPSULE | Refills: 0 | Status: SHIPPED | OUTPATIENT
Start: 2025-04-16 | End: 2025-04-23

## 2025-04-16 NOTE — PATIENT INSTRUCTIONS
Maintain adequate hydration.   Take prescription nitrofurantoin as directed   Monitor symptoms closely.   Take Tylenol or ibuprofen OTC for fever and pain   Watch out for worsening urinary symptoms, blood in the urine, flank pain, fever, chills, myalgias, and vomiting.    Seek further medical attention immediately at the 1st sign.    Follow-up with your PCP within 72 hours.    We will call you with urine culture results within the next 24-48 hours.

## 2025-04-16 NOTE — PROGRESS NOTES
"Subjective:      Patient ID: Siobhan Hebert is a 19 y.o. female.    Vitals:  height is 5' 8" (1.727 m) and weight is 56.7 kg (125 lb). Her oral temperature is 98.4 °F (36.9 °C). Her blood pressure is 133/83 and her pulse is 106. Her respiration is 18 and oxygen saturation is 99%.     Chief Complaint: Dysuria     Patient is a 19 y.o. female who presents to urgent care with complaints of dysuria, suprapubic discomfort  x 3 days.  No recent injury or trauma. Patient denies nausea, fever, vomiting, and diarrhea.      Gastrointestinal:  Positive for abdominal pain (suprapubic discomfort).   Genitourinary:  Positive for dysuria, frequency and urgency. Negative for flank pain.      Objective:     Physical Exam   Constitutional: She is oriented to person, place, and time. She appears well-developed.   HENT:   Head: Normocephalic and atraumatic.   Ears:   Right Ear: External ear normal.   Left Ear: External ear normal.   Nose: Nose normal.   Mouth/Throat: Mucous membranes are normal.   Eyes: Conjunctivae and lids are normal.   Neck: Trachea normal. Neck supple.   Cardiovascular: Normal rate, regular rhythm and normal heart sounds.   Pulmonary/Chest: Effort normal and breath sounds normal. No respiratory distress.   Abdominal: Normal appearance and bowel sounds are normal. She exhibits no distension and no mass. Soft. There is no abdominal tenderness.   Musculoskeletal: Normal range of motion.         General: Normal range of motion.   Neurological: She is alert and oriented to person, place, and time. She has normal strength.   Skin: Skin is warm, dry, intact, not diaphoretic and not pale.   Psychiatric: Her speech is normal and behavior is normal. Judgment and thought content normal.   Nursing note and vitals reviewed.    Previous History:     Review of patient's allergies indicates:   Allergen Reactions    Milk      Flares up eczema       Past Medical History:   Diagnosis Date    Acne     Anxiety     Eczema     " Migraines      Current Outpatient Medications   Medication Instructions    busPIRone (BUSPAR) 7.5 mg, Oral, 3 times daily    cetirizine (ZYRTEC) 10 mg, Daily    ketoconazole (NIZORAL) 2 % shampoo PLEASE SEE ATTACHED FOR DETAILED DIRECTIONS    LO LOESTRIN FE 1 mg-10 mcg (24)/10 mcg (2) Tab 1 tablet, Daily    nitrofurantoin, macrocrystal-monohydrate, (MACROBID) 100 MG capsule 100 mg, Oral, 2 times daily    spironolactone (ALDACTONE) 100 MG tablet No dose, route, or frequency recorded.    triamcinolone acetonide 0.1% (KENALOG) 0.1 % cream Topical (Top), 2 times daily PRN    UBRELVY 100 mg tablet Take by mouth.     Past Surgical History:   Procedure Laterality Date    NO PAST SURGERIES       Family History   Problem Relation Name Age of Onset    No Known Problems Mother      No Known Problems Father      Hypertension Brother         Social History[1]  Assessment:     1. Dysuria    2. Acute cystitis without hematuria      Office Visit on 04/16/2025   Component Date Value Ref Range Status    POC Blood, Urine 04/16/2025 Negative  Negative Final    POC Bilirubin, Urine 04/16/2025 Negative  Negative Final    POC Urobilinogen, Urine 04/16/2025 normal  0.1 - 1.1 Final    POC Ketones, Urine 04/16/2025 Negative  Negative Final    POC Protein, Urine 04/16/2025 Positive (A)  Negative Final    POC Nitrates, Urine 04/16/2025 Positive (A)  Negative Final    POC Glucose, Urine 04/16/2025 Negative  Negative Final    pH, UA 04/16/2025 6   Final    POC Specific Gravity, Urine 04/16/2025 1.010  1.003 - 1.029 Final    POC Leukocytes, Urine 04/16/2025 Positive (A)  Negative Final       Plan:   Maintain adequate hydration.   Prescription nitrofurantoin as directed   Monitor symptoms closely.   Take Tylenol or ibuprofen OTC for fever and pain   Watch out for worsening urinary symptoms, blood in the urine, flank pain, fever, chills, myalgias, and vomiting.    Seek further medical attention immediately at the 1st sign.     Follow-up with your PCP within 72 hours.    We will call you with urine culture results within the next 24-48 hours.     Dysuria  -     POCT Urinalysis, Dipstick, Manual, W/O Scope    Acute cystitis without hematuria  -     nitrofurantoin, macrocrystal-monohydrate, (MACROBID) 100 MG capsule; Take 1 capsule (100 mg total) by mouth 2 (two) times daily. for 7 days  Dispense: 14 capsule; Refill: 0  -     Urine Culture High Risk                           [1]  Social History  Tobacco Use    Smoking status: Never     Passive exposure: Never    Smokeless tobacco: Never   Substance Use Topics    Alcohol use: Never    Drug use: Never

## 2025-04-17 ENCOUNTER — RESULTS FOLLOW-UP (OUTPATIENT)
Dept: URGENT CARE | Facility: CLINIC | Age: 20
End: 2025-04-17

## 2025-04-18 LAB — BACTERIA UR CULT: ABNORMAL

## 2025-05-01 ENCOUNTER — OFFICE VISIT (OUTPATIENT)
Dept: URGENT CARE | Facility: CLINIC | Age: 20
End: 2025-05-01
Payer: COMMERCIAL

## 2025-05-01 VITALS
SYSTOLIC BLOOD PRESSURE: 111 MMHG | RESPIRATION RATE: 18 BRPM | TEMPERATURE: 98 F | BODY MASS INDEX: 18.94 KG/M2 | HEART RATE: 94 BPM | WEIGHT: 125 LBS | HEIGHT: 68 IN | OXYGEN SATURATION: 99 % | DIASTOLIC BLOOD PRESSURE: 69 MMHG

## 2025-05-01 DIAGNOSIS — R11.0 NAUSEA: ICD-10-CM

## 2025-05-01 DIAGNOSIS — R05.9 COUGH, UNSPECIFIED TYPE: Primary | ICD-10-CM

## 2025-05-01 RX ORDER — ONDANSETRON 4 MG/1
4 TABLET, ORALLY DISINTEGRATING ORAL EVERY 12 HOURS PRN
Qty: 20 TABLET | Refills: 0 | Status: SHIPPED | OUTPATIENT
Start: 2025-05-01 | End: 2025-05-11

## 2025-05-01 RX ORDER — BETAMETHASONE SODIUM PHOSPHATE AND BETAMETHASONE ACETATE 3; 3 MG/ML; MG/ML
6 INJECTION, SUSPENSION INTRA-ARTICULAR; INTRALESIONAL; INTRAMUSCULAR; SOFT TISSUE
Status: COMPLETED | OUTPATIENT
Start: 2025-05-01 | End: 2025-05-01

## 2025-05-01 RX ADMIN — BETAMETHASONE SODIUM PHOSPHATE AND BETAMETHASONE ACETATE 6 MG: 3; 3 INJECTION, SUSPENSION INTRA-ARTICULAR; INTRALESIONAL; INTRAMUSCULAR; SOFT TISSUE at 01:05

## 2025-05-01 NOTE — PATIENT INSTRUCTIONS
Take zofran as needed for nausea.  Mucinex OTC for cough as directed  Rest and stay hydrated.  Increase oral fluids  Take tylenol/motrin as needed for pain/fever.  Eat a bland diet for the next few days while your stomach recovers.  Please follow up with your primary care provider within 2-5 days if your signs and symptoms have not resolved or worsen.   If your condition worsens or fails to improve we recommend that you receive another evaluation at the emergency room immediately or contact your primary medical clinic to discuss your concerns.

## 2025-05-01 NOTE — PROGRESS NOTES
"Subjective:      Patient ID: Siobhan Hebert is a 19 y.o. female.    Vitals:  height is 5' 8" (1.727 m) and weight is 56.7 kg (125 lb). Her temperature is 98 °F (36.7 °C). Her blood pressure is 111/69 and her pulse is 94. Her respiration is 18 and oxygen saturation is 99%.     Chief Complaint: Cough     Patient is a 19 y.o. female who presents to urgent care with complaints of coughing, post nasal drip, vomiting, chills x2 days.      Constitution: Positive for chills.   HENT:  Positive for sore throat.    Respiratory:  Positive for cough.    Gastrointestinal:  Positive for nausea (mild intermittent) and vomiting (resolved). Negative for abdominal pain and diarrhea.      Objective:     Physical Exam   Constitutional: She is oriented to person, place, and time. She appears well-developed. She is cooperative.  Non-toxic appearance. She does not appear ill. No distress.   HENT:   Head: Normocephalic and atraumatic.   Ears:   Right Ear: Hearing, tympanic membrane, external ear and ear canal normal.   Left Ear: Hearing, tympanic membrane, external ear and ear canal normal.   Nose: Nose normal. No mucosal edema, rhinorrhea or nasal deformity. No epistaxis. Right sinus exhibits no maxillary sinus tenderness and no frontal sinus tenderness. Left sinus exhibits no maxillary sinus tenderness and no frontal sinus tenderness.   Mouth/Throat: Uvula is midline, oropharynx is clear and moist and mucous membranes are normal. No trismus in the jaw. Normal dentition. No uvula swelling. No oropharyngeal exudate, posterior oropharyngeal edema or posterior oropharyngeal erythema.   Eyes: Conjunctivae and lids are normal. No scleral icterus.   Neck: Trachea normal and phonation normal. Neck supple. No edema present. No erythema present. No neck rigidity present.   Cardiovascular: Normal rate, regular rhythm, normal heart sounds and normal pulses.   Pulmonary/Chest: Effort normal and breath sounds normal. No respiratory distress. She has no " decreased breath sounds. She has no rhonchi.   Abdominal: Normal appearance.   Musculoskeletal: Normal range of motion.         General: No deformity. Normal range of motion.   Neurological: She is alert and oriented to person, place, and time. She exhibits normal muscle tone. Coordination normal.   Skin: Skin is warm, dry, intact, not diaphoretic and not pale.   Psychiatric: Her speech is normal and behavior is normal. Judgment and thought content normal.   Nursing note and vitals reviewed.    Previous History:     Review of patient's allergies indicates:   Allergen Reactions    Milk      Flares up eczema       Past Medical History:   Diagnosis Date    Acne     Anxiety     Eczema     Migraines      Current Outpatient Medications   Medication Instructions    busPIRone (BUSPAR) 7.5 mg, Oral, 3 times daily    cetirizine (ZYRTEC) 10 mg, Daily    ketoconazole (NIZORAL) 2 % shampoo PLEASE SEE ATTACHED FOR DETAILED DIRECTIONS    LO LOESTRIN FE 1 mg-10 mcg (24)/10 mcg (2) Tab 1 tablet, Daily    ondansetron (ZOFRAN-ODT) 4 mg, Oral, Every 12 hours PRN    spironolactone (ALDACTONE) 100 MG tablet No dose, route, or frequency recorded.    triamcinolone acetonide 0.1% (KENALOG) 0.1 % cream Topical (Top), 2 times daily PRN    UBRELVY 100 mg tablet Take by mouth.     Past Surgical History:   Procedure Laterality Date    NO PAST SURGERIES       Family History   Problem Relation Name Age of Onset    No Known Problems Mother      No Known Problems Father      Hypertension Brother         Social History[1]  Assessment:     1. Cough, unspecified type    2. Nausea      Office Visit on 05/01/2025   Component Date Value Ref Range Status    POC Molecular Influenza A Ag 05/01/2025 Negative  Negative Final    POC Molecular Influenza B Ag 05/01/2025 Negative  Negative Final     Acceptable 05/01/2025 Yes   Final    Molecular Strep A, POC 05/01/2025 Negative  Negative Final     Acceptable 05/01/2025 Yes   Final     SARS Coronavirus 2 Antigen 05/01/2025 Negative  Negative, Presumptive Negative Final     Acceptable 05/01/2025 Yes   Final       Plan:   Take zofran as needed for nausea.  Mucinex OTC for cough as directed  Rest and stay hydrated.  Increase oral fluids  Take tylenol/motrin as needed for pain/fever.  Eat a bland diet for the next few days while your stomach recovers.  Please follow up with your primary care provider within 2-5 days if your signs and symptoms have not resolved or worsen.   If your condition worsens or fails to improve we recommend that you receive another evaluation at the emergency room immediately or contact your primary medical clinic to discuss your concerns.   Patient states currently on menstrual cycle no chance of being pregnant.         Cough, unspecified type  -     POCT Influenza A/B Molecular  -     POCT Strep A, Molecular  -     SARS Coronavirus 2 Antigen, POCT Manual Read  -     betamethasone acetate-betamethasone sodium phosphate injection 6 mg    Nausea  -     ondansetron (ZOFRAN-ODT) 4 MG TbDL; Take 1 tablet (4 mg total) by mouth every 12 (twelve) hours as needed (vomiting).  Dispense: 20 tablet; Refill: 0                         [1]   Social History  Tobacco Use    Smoking status: Never     Passive exposure: Never    Smokeless tobacco: Never   Substance Use Topics    Alcohol use: Never    Drug use: Never

## 2025-05-04 ENCOUNTER — TELEPHONE (OUTPATIENT)
Dept: URGENT CARE | Facility: CLINIC | Age: 20
End: 2025-05-04
Payer: COMMERCIAL

## 2025-05-04 ENCOUNTER — OFFICE VISIT (OUTPATIENT)
Dept: URGENT CARE | Facility: CLINIC | Age: 20
End: 2025-05-04
Payer: COMMERCIAL

## 2025-05-04 VITALS
WEIGHT: 125 LBS | HEIGHT: 68 IN | HEART RATE: 96 BPM | DIASTOLIC BLOOD PRESSURE: 77 MMHG | RESPIRATION RATE: 18 BRPM | BODY MASS INDEX: 18.94 KG/M2 | SYSTOLIC BLOOD PRESSURE: 118 MMHG | TEMPERATURE: 99 F | OXYGEN SATURATION: 99 %

## 2025-05-04 DIAGNOSIS — J02.9 ACUTE PHARYNGITIS, UNSPECIFIED ETIOLOGY: Primary | ICD-10-CM

## 2025-05-04 DIAGNOSIS — J02.9 SORE THROAT: ICD-10-CM

## 2025-05-04 DIAGNOSIS — R05.8 PRODUCTIVE COUGH: ICD-10-CM

## 2025-05-04 LAB
CTP QC/QA: YES
MOLECULAR STREP A: NEGATIVE

## 2025-05-04 PROCEDURE — 87651 STREP A DNA AMP PROBE: CPT | Mod: QW,,, | Performed by: NURSE PRACTITIONER

## 2025-05-04 PROCEDURE — 99213 OFFICE O/P EST LOW 20 MIN: CPT | Mod: ,,, | Performed by: NURSE PRACTITIONER

## 2025-05-04 RX ORDER — AZITHROMYCIN 250 MG/1
TABLET, FILM COATED ORAL
Qty: 6 TABLET | Refills: 0 | Status: SHIPPED | OUTPATIENT
Start: 2025-05-04

## 2025-05-04 NOTE — PATIENT INSTRUCTIONS
INSTRUCTIONS:          Pharyngitis, or sore throat, is inflammation of the tissues and structures in your pharynx (throat). Pharyngitis is most often caused by bacteria or a virus. Other causes include smoking, allergies, or acid reflux.    DISCHARGE INSTRUCTIONS:  Call your local emergency number (911 in the [place]) if:  You have trouble breathing or swallowing because your throat is swollen.    Return to the emergency department if:  You are drooling because it hurts too much to swallow.  Your fever is higher than 102?F (39?C) or lasts longer than 3 days.  You are confused.  You taste blood.    Call your doctor if:  Your throat pain gets worse.  You have a painful lump in your throat that does not go away after 5 days.  Your symptoms do not improve after 5 days.  You have questions or concerns about your condition or care.    Medicines:  Viral pharyngitis will go away on its own without treatment. Your sore throat should start to feel better in 3 to 5 days. You may need any of the following:    Antibiotics treat a bacterial infection.    NSAIDs help decrease swelling and pain or fever. This medicine is available with or without a doctor's order. NSAIDs can cause stomach bleeding or kidney problems in certain people. If you take blood thinner medicine, always ask your healthcare provider if NSAIDs are safe for you. Always read the medicine label and follow directions.    Acetaminophen decreases pain and fever. It is available without a doctor's order. Ask how much to take and how often to take it. Follow directions. Read the labels of all other medicines you are using to see if they also contain acetaminophen, or ask your doctor or pharmacist. Acetaminophen can cause liver damage if not taken correctly.    Take your medicine as directed. Contact your healthcare provider if you think your medicine is not helping or if you have side effects. Tell your provider if you are allergic to any medicine. Keep a list of the  medicines, vitamins, and herbs you take. Include the amounts, and when and why you take them. Bring the list or the pill bottles to follow-up visits. Carry your medicine list with you in case of an emergency.    Manage your symptoms:  Gargle salt water. Mix ¼ teaspoon salt in an 8 ounce glass of warm water and gargle. Do not swallow. Salt water may help decrease swelling in your throat.  Drink liquids as directed. You may need to drink more liquids than usual. Liquids may help soothe your throat and prevent dehydration. Ask how much liquid to drink each day and which liquids are best for you.    Use a cool mist humidifier. This will add moisture to the air and help decrease your cough.  Soothe your throat. Cough drops, ice, soft foods, or popsicles may help decrease throat pain.    Prevent the spread of pharyngitis:  Cover your mouth and nose when you cough or sneeze. Do not share food or drinks. Wash your hands often. Use soap and water. If soap and water are unavailable, use an alcohol-based hand .    Handwashing    Take all of antibiotics as prescribed    Change toothbrush after 48 hours of being on antibiotics    Quarantine for 48 hours after starting antibiotics.    Tylenol and Motrin for pain and fever

## 2025-05-04 NOTE — PROGRESS NOTES
"Subjective:      Patient ID: Siobhan Hebert is a 19 y.o. female.    Vitals:  height is 5' 8" (1.727 m) and weight is 56.7 kg (125 lb). Her oral temperature is 98.7 °F (37.1 °C). Her blood pressure is 118/77 and her pulse is 96. Her respiration is 18 and oxygen saturation is 99%.     Chief Complaint: Sore Throat     Patient is a 19 y.o. female who presents to urgent care with complaints of fever, sore throat, productive cough, glands feel swollen x 6 days.  Alleviating factors include Nyquil and Mucinex. Patient evaluated in clinic on 5/1/25 for same s/s and was given Celestone injection.       Constitution: Positive for fatigue and fever.   HENT:  Positive for congestion and sore throat.    Neck: Positive for painful lymph nodes.   Cardiovascular: Negative.    Eyes: Negative.    Respiratory:  Positive for cough and sputum production.    Gastrointestinal: Negative.    Endocrine: negative.   Genitourinary: Negative.    Musculoskeletal: Negative.    Skin: Negative.    Allergic/Immunologic: Negative.    Neurological: Negative.    Hematologic/Lymphatic: Positive for swollen lymph nodes.   Psychiatric/Behavioral: Negative.        Objective:     Physical Exam   Constitutional: She is oriented to person, place, and time. She appears well-developed. She is cooperative. She does not appear ill.   HENT:   Head: Normocephalic and atraumatic.   Ears:   Right Ear: Hearing, tympanic membrane, external ear and ear canal normal.   Left Ear: Hearing, tympanic membrane, external ear and ear canal normal.   Nose: Congestion present. No mucosal edema or nasal deformity. No epistaxis. Right sinus exhibits no maxillary sinus tenderness and no frontal sinus tenderness. Left sinus exhibits no maxillary sinus tenderness and no frontal sinus tenderness.   Mouth/Throat: Uvula is midline and mucous membranes are normal. No trismus in the jaw. Normal dentition. No uvula swelling. Oropharyngeal exudate and posterior oropharyngeal erythema present. "       Eyes: Conjunctivae and lids are normal.   Neck: Trachea normal and phonation normal. Neck supple.   Cardiovascular: Normal rate, regular rhythm, normal heart sounds and normal pulses.   Pulmonary/Chest: Effort normal and breath sounds normal.   Abdominal: Normal appearance and bowel sounds are normal. Soft.   Musculoskeletal: Normal range of motion.         General: Normal range of motion.   Lymphadenopathy:     She has cervical adenopathy.        Right cervical: Superficial cervical adenopathy present.        Left cervical: Superficial cervical adenopathy present.   Neurological: no focal deficit. She is alert and oriented to person, place, and time. She exhibits normal muscle tone.   Skin: Skin is warm, dry and intact. Capillary refill takes less than 2 seconds.   Psychiatric: Her speech is normal and behavior is normal. Judgment and thought content normal.   Nursing note and vitals reviewed.      Assessment:     1. Acute pharyngitis, unspecified etiology    2. Sore throat        Plan:   INSTRUCTIONS:          Pharyngitis, or sore throat, is inflammation of the tissues and structures in your pharynx (throat). Pharyngitis is most often caused by bacteria or a virus. Other causes include smoking, allergies, or acid reflux.    DISCHARGE INSTRUCTIONS:  Call your local emergency number (911 in the [place]) if:  You have trouble breathing or swallowing because your throat is swollen.    Return to the emergency department if:  You are drooling because it hurts too much to swallow.  Your fever is higher than 102°F (39°C) or lasts longer than 3 days.  You are confused.  You taste blood.    Call your doctor if:  Your throat pain gets worse.  You have a painful lump in your throat that does not go away after 5 days.  Your symptoms do not improve after 5 days.  You have questions or concerns about your condition or care.    Medicines:  Viral pharyngitis will go away on its own without treatment. Your sore throat should start  to feel better in 3 to 5 days. You may need any of the following:    Antibiotics treat a bacterial infection.    NSAIDs help decrease swelling and pain or fever. This medicine is available with or without a doctor's order. NSAIDs can cause stomach bleeding or kidney problems in certain people. If you take blood thinner medicine, always ask your healthcare provider if NSAIDs are safe for you. Always read the medicine label and follow directions.    Acetaminophen decreases pain and fever. It is available without a doctor's order. Ask how much to take and how often to take it. Follow directions. Read the labels of all other medicines you are using to see if they also contain acetaminophen, or ask your doctor or pharmacist. Acetaminophen can cause liver damage if not taken correctly.    Take your medicine as directed. Contact your healthcare provider if you think your medicine is not helping or if you have side effects. Tell your provider if you are allergic to any medicine. Keep a list of the medicines, vitamins, and herbs you take. Include the amounts, and when and why you take them. Bring the list or the pill bottles to follow-up visits. Carry your medicine list with you in case of an emergency.    Manage your symptoms:  Gargle salt water. Mix ¼ teaspoon salt in an 8 ounce glass of warm water and gargle. Do not swallow. Salt water may help decrease swelling in your throat.  Drink liquids as directed. You may need to drink more liquids than usual. Liquids may help soothe your throat and prevent dehydration. Ask how much liquid to drink each day and which liquids are best for you.    Use a cool mist humidifier. This will add moisture to the air and help decrease your cough.  Soothe your throat. Cough drops, ice, soft foods, or popsicles may help decrease throat pain.    Prevent the spread of pharyngitis:  Cover your mouth and nose when you cough or sneeze. Do not share food or drinks. Wash your hands often. Use soap  and water. If soap and water are unavailable, use an alcohol-based hand .    Handwashing    Take all of antibiotics as prescribed    Change toothbrush after 48 hours of being on antibiotics    Quarantine for 48 hours after starting antibiotics.    Tylenol and Motrin for pain and fever        Acute pharyngitis, unspecified etiology  -     azithromycin (ZITHROMAX Z-BEBE) 250 MG tablet; Take 2 tablets (500 mg) on  Day 1,  followed by 1 tablet (250 mg) once daily on Days 2 through 5.  Dispense: 6 tablet; Refill: 0  -     pyrilamine-phenylephrine-DM 12.5-5-7.5 mg/5 mL Liqd; Take 10 mLs by mouth every 6 (six) hours as needed (congestion/cough).  Dispense: 125 mL; Refill: 0    Sore throat  -     POCT Strep A, Molecular  -     azithromycin (ZITHROMAX Z-BEBE) 250 MG tablet; Take 2 tablets (500 mg) on  Day 1,  followed by 1 tablet (250 mg) once daily on Days 2 through 5.  Dispense: 6 tablet; Refill: 0  -     pyrilamine-phenylephrine-DM 12.5-5-7.5 mg/5 mL Liqd; Take 10 mLs by mouth every 6 (six) hours as needed (congestion/cough).  Dispense: 125 mL; Refill: 0

## 2025-05-04 NOTE — TELEPHONE ENCOUNTER
Pt was seen on 05/01 for sore throat and cough. Pt tested negative in office for Covid flu and strep was diagnosed with URI. Pt mom called saying pt throat pain is worse and she also noticed white spot on tonsils. Pt tried taking mucinex and day/Ny quil with no relief. Pt denies fever, nv/d. [facility], [place] is good pharmacy for pt.

## 2025-08-12 ENCOUNTER — LAB VISIT (OUTPATIENT)
Dept: LAB | Facility: HOSPITAL | Age: 20
End: 2025-08-12
Attending: FAMILY MEDICINE
Payer: COMMERCIAL

## 2025-08-12 DIAGNOSIS — Z83.3 FAMILY HISTORY OF DIABETES MELLITUS: ICD-10-CM

## 2025-08-12 DIAGNOSIS — L70.9 ACNE, UNSPECIFIED ACNE TYPE: ICD-10-CM

## 2025-08-12 DIAGNOSIS — R20.2 PARESTHESIA: ICD-10-CM

## 2025-08-12 DIAGNOSIS — F41.9 ANXIETY: ICD-10-CM

## 2025-08-12 DIAGNOSIS — J30.2 SEASONAL ALLERGIES: ICD-10-CM

## 2025-08-12 DIAGNOSIS — N92.6 IRREGULAR MENSTRUAL CYCLE: ICD-10-CM

## 2025-08-12 DIAGNOSIS — Z00.00 WELLNESS EXAMINATION: ICD-10-CM

## 2025-08-12 LAB
ALBUMIN SERPL-MCNC: 4.2 G/DL (ref 3.5–5)
ALBUMIN/GLOB SERPL: 1.4 RATIO (ref 1.1–2)
ALP SERPL-CCNC: 51 UNIT/L (ref 40–150)
ALT SERPL-CCNC: 32 UNIT/L (ref 0–55)
ANION GAP SERPL CALC-SCNC: 7 MEQ/L
AST SERPL-CCNC: 25 UNIT/L (ref 11–45)
BASOPHILS # BLD AUTO: 0.07 X10(3)/MCL
BASOPHILS NFR BLD AUTO: 1.2 %
BILIRUB SERPL-MCNC: 0.8 MG/DL
BUN SERPL-MCNC: 10.7 MG/DL (ref 7–18.7)
CALCIUM SERPL-MCNC: 9.8 MG/DL (ref 8.4–10.2)
CHLORIDE SERPL-SCNC: 110 MMOL/L (ref 98–107)
CHOLEST SERPL-MCNC: 184 MG/DL
CHOLEST/HDLC SERPL: 3 {RATIO} (ref 0–5)
CO2 SERPL-SCNC: 26 MMOL/L (ref 22–29)
CREAT SERPL-MCNC: 0.8 MG/DL (ref 0.55–1.02)
CREAT/UREA NIT SERPL: 13
EOSINOPHIL # BLD AUTO: 0.18 X10(3)/MCL (ref 0–0.9)
EOSINOPHIL NFR BLD AUTO: 3.1 %
ERYTHROCYTE [DISTWIDTH] IN BLOOD BY AUTOMATED COUNT: 12.5 % (ref 11.5–17)
EST. AVERAGE GLUCOSE BLD GHB EST-MCNC: 93.9 MG/DL
GFR SERPLBLD CREATININE-BSD FMLA CKD-EPI: >60 ML/MIN/1.73/M2
GLOBULIN SER-MCNC: 3.1 GM/DL (ref 2.4–3.5)
GLUCOSE SERPL-MCNC: 83 MG/DL (ref 74–100)
HBA1C MFR BLD: 4.9 %
HCT VFR BLD AUTO: 45 % (ref 37–47)
HDLC SERPL-MCNC: 60 MG/DL (ref 35–60)
HGB BLD-MCNC: 15 G/DL (ref 12–16)
IMM GRANULOCYTES # BLD AUTO: 0.02 X10(3)/MCL (ref 0–0.04)
IMM GRANULOCYTES NFR BLD AUTO: 0.3 %
LDLC SERPL CALC-MCNC: 113 MG/DL (ref 50–140)
LYMPHOCYTES # BLD AUTO: 1.84 X10(3)/MCL (ref 0.6–4.6)
LYMPHOCYTES NFR BLD AUTO: 31.8 %
MCH RBC QN AUTO: 31.1 PG (ref 27–31)
MCHC RBC AUTO-ENTMCNC: 33.3 G/DL (ref 33–36)
MCV RBC AUTO: 93.2 FL (ref 80–94)
MONOCYTES # BLD AUTO: 0.33 X10(3)/MCL (ref 0.1–1.3)
MONOCYTES NFR BLD AUTO: 5.7 %
NEUTROPHILS # BLD AUTO: 3.35 X10(3)/MCL (ref 2.1–9.2)
NEUTROPHILS NFR BLD AUTO: 57.9 %
NRBC BLD AUTO-RTO: 0 %
PLATELET # BLD AUTO: 235 X10(3)/MCL (ref 130–400)
PMV BLD AUTO: 10.9 FL (ref 7.4–10.4)
POTASSIUM SERPL-SCNC: 4.7 MMOL/L (ref 3.5–5.1)
PROT SERPL-MCNC: 7.3 GM/DL (ref 6.4–8.3)
RBC # BLD AUTO: 4.83 X10(6)/MCL (ref 4.2–5.4)
SODIUM SERPL-SCNC: 143 MMOL/L (ref 136–145)
TRIGL SERPL-MCNC: 54 MG/DL (ref 37–140)
TSH SERPL-ACNC: 1.78 UIU/ML (ref 0.35–4.94)
VLDLC SERPL CALC-MCNC: 11 MG/DL
WBC # BLD AUTO: 5.79 X10(3)/MCL (ref 4.5–11.5)

## 2025-08-12 PROCEDURE — 80053 COMPREHEN METABOLIC PANEL: CPT

## 2025-08-12 PROCEDURE — 36415 COLL VENOUS BLD VENIPUNCTURE: CPT

## 2025-08-12 PROCEDURE — 80061 LIPID PANEL: CPT

## 2025-08-12 PROCEDURE — 84443 ASSAY THYROID STIM HORMONE: CPT

## 2025-08-12 PROCEDURE — 83036 HEMOGLOBIN GLYCOSYLATED A1C: CPT

## 2025-08-12 PROCEDURE — 85025 COMPLETE CBC W/AUTO DIFF WBC: CPT

## 2025-08-13 ENCOUNTER — OFFICE VISIT (OUTPATIENT)
Dept: FAMILY MEDICINE | Facility: CLINIC | Age: 20
End: 2025-08-13
Payer: COMMERCIAL

## 2025-08-13 VITALS
DIASTOLIC BLOOD PRESSURE: 74 MMHG | HEIGHT: 68 IN | RESPIRATION RATE: 16 BRPM | WEIGHT: 133.63 LBS | SYSTOLIC BLOOD PRESSURE: 116 MMHG | OXYGEN SATURATION: 99 % | TEMPERATURE: 99 F | BODY MASS INDEX: 20.25 KG/M2 | HEART RATE: 71 BPM

## 2025-08-13 DIAGNOSIS — Z00.00 WELLNESS EXAMINATION: Primary | ICD-10-CM

## 2025-08-13 DIAGNOSIS — J30.2 SEASONAL ALLERGIES: ICD-10-CM

## 2025-08-13 DIAGNOSIS — G43.809 OTHER MIGRAINE WITHOUT STATUS MIGRAINOSUS, NOT INTRACTABLE: ICD-10-CM

## 2025-08-13 DIAGNOSIS — F41.9 ANXIETY: ICD-10-CM

## 2025-08-13 DIAGNOSIS — N92.6 IRREGULAR MENSTRUAL CYCLE: ICD-10-CM

## 2025-08-13 PROBLEM — L70.9 ACNE: Status: RESOLVED | Noted: 2022-08-07 | Resolved: 2025-08-13

## 2025-08-13 PROBLEM — N30.00 ACUTE CYSTITIS WITHOUT HEMATURIA: Status: RESOLVED | Noted: 2023-11-08 | Resolved: 2025-08-13

## 2025-08-13 PROBLEM — L30.9 ECZEMA: Status: RESOLVED | Noted: 2022-08-04 | Resolved: 2025-08-13

## 2025-08-13 PROCEDURE — 3044F HG A1C LEVEL LT 7.0%: CPT | Mod: CPTII,,, | Performed by: FAMILY MEDICINE

## 2025-08-13 PROCEDURE — 1160F RVW MEDS BY RX/DR IN RCRD: CPT | Mod: CPTII,,, | Performed by: FAMILY MEDICINE

## 2025-08-13 PROCEDURE — 3008F BODY MASS INDEX DOCD: CPT | Mod: CPTII,,, | Performed by: FAMILY MEDICINE

## 2025-08-13 PROCEDURE — 3078F DIAST BP <80 MM HG: CPT | Mod: CPTII,,, | Performed by: FAMILY MEDICINE

## 2025-08-13 PROCEDURE — 99395 PREV VISIT EST AGE 18-39: CPT | Mod: ,,, | Performed by: FAMILY MEDICINE

## 2025-08-13 PROCEDURE — 3074F SYST BP LT 130 MM HG: CPT | Mod: CPTII,,, | Performed by: FAMILY MEDICINE

## 2025-08-13 PROCEDURE — 1159F MED LIST DOCD IN RCRD: CPT | Mod: CPTII,,, | Performed by: FAMILY MEDICINE

## 2025-08-13 RX ORDER — BUSPIRONE HYDROCHLORIDE 7.5 MG/1
7.5 TABLET ORAL 3 TIMES DAILY
Qty: 270 TABLET | Refills: 3 | Status: SHIPPED | OUTPATIENT
Start: 2025-08-13 | End: 2026-08-13

## 2025-08-13 RX ORDER — UBROGEPANT 100 MG/1
100 TABLET ORAL
Qty: 15 TABLET | Refills: 5 | Status: SHIPPED | OUTPATIENT
Start: 2025-08-13

## 2025-08-13 RX ORDER — NORETHINDRONE ACETATE AND ETHINYL ESTRADIOL, ETHINYL ESTRADIOL AND FERROUS FUMARATE 1MG-10(24)
1 KIT ORAL DAILY
Qty: 28 TABLET | Refills: 5 | Status: SHIPPED | OUTPATIENT
Start: 2025-08-13

## 2025-08-13 RX ORDER — AA/PROT/LYSINE/METHIO/VIT C/B6 50-12.5 MG
TABLET ORAL
COMMUNITY